# Patient Record
(demographics unavailable — no encounter records)

---

## 2025-01-29 NOTE — HISTORY OF PRESENT ILLNESS
[FreeTextEntry1] : Patient is here for a follow-up visit today, last seen on 7/1024, Is accompanied by her daughter and son. Patient has no new complaints, she is living at the assisted living facility with her , she reports she is enjoying living there, she attends physical therapy thrice weekly, plays bingo, works out and is social, has made friends, her  has not adjusted to the place well.  Patient has not noted any significant decline in her cognition or mood, as per her daughter she is less irritable and less anxious.  Patient continues to take sertraline 75 Mg daily, at the last visit, was started on memantine ER 7 Mg in addition she continues to take donepezil 5 Mg daily.    Patient followed up with neurosurgeon Dr. 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  Patient's daughter also informs me that on November 24th she had a fall at the assisted living facility, was taken to [facility], CT scan head and CT cervical spine showed no acute finding

## 2025-01-29 NOTE — DATA REVIEWED
[de-identified] : 1/11/2024: MR brain: Advanced subcortical white matter ischemia including shana.  Tiny infarctions in B/L basal ganglia and thalami.  Mild to moderate temporal and parietal lobe and cerebellar atrophy 4/2023: MRI brain severe chronic white matter ischemic changes.  No acute infarct. 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 Global cognitive score; 76.1 More than 1 SD below average noted in domains; Memory 77.4, executive function 79.6, attention 71.2 Below average in domains; None Above average in domains: None Information processing speed, visual-spatial function, verbal function and motor skills not assessed due to insufficient data [de-identified] : 3/14/2024: B12 773, folate > 20, TSH 2.41 10/18/23: MRI cervical spine: acute fracture of anterior inferior corner of C4, no bony retropulsion or cord compression was noted.   CT head no acute injury, CT face + nasal bone fracture,

## 2025-01-29 NOTE — REASON FOR VISIT
[Follow-Up: _____] : a [unfilled] follow-up visit [Family Member] : family member [FreeTextEntry1] : For dementia

## 2025-01-29 NOTE — REVIEW OF SYSTEMS
[Memory Lapses or Loss] : memory loss [Decr. Concentrating Ability] : decreased concentrating ability [Poor Coordination] : poor coordination [Dizziness] : dizziness [Difficulty Walking] : difficulty walking [Frequent Falls] : frequent falls [Anxiety] : anxiety [As Noted in HPI] : as noted in HPI [Negative] : Heme/Lymph [de-identified] : irritable with mood changes [FreeTextEntry8] : Increased urinary frequency occasional incontinence

## 2025-01-29 NOTE — DISCUSSION/SUMMARY
[FreeTextEntry1] : 84-year-old F with PMHx of HTN, HLD, anxiety, unruptured cerebral aneurysm, several falls over the past year 1, one resulted in thoracic compression fracture, most recent fall 10/2023 at [facility] resulting in nasal bone and C4 fracture, syncopal episode in August 2023, made remarkable recovery, has been using a walker for support, come in for short-term memory lapses and mild cognitive decline.   Cognitive test; Global score 76.1. >1 SD below average noted in memory, attention and executive function. 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

## 2025-01-29 NOTE — DATA REVIEWED
[de-identified] : 1/11/2024: MR brain: Advanced subcortical white matter ischemia including shana.  Tiny infarctions in B/L basal ganglia and thalami.  Mild to moderate temporal and parietal lobe and cerebellar atrophy 4/2023: MRI brain severe chronic white matter ischemic changes.  No acute infarct. 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 Global cognitive score; 76.1 More than 1 SD below average noted in domains; Memory 77.4, executive function 79.6, attention 71.2 Below average in domains; None Above average in domains: None Information processing speed, visual-spatial function, verbal function and motor skills not assessed due to insufficient data [de-identified] : 3/14/2024: B12 773, folate > 20, TSH 2.41 10/18/23: MRI cervical spine: acute fracture of anterior inferior corner of C4, no bony retropulsion or cord compression was noted.   CT head no acute injury, CT face + nasal bone fracture,

## 2025-01-29 NOTE — PHYSICAL EXAM
[General Appearance - Alert] : alert [General Appearance - In No Acute Distress] : in no acute distress [Oriented To Time, Place, And Person] : oriented to person, place, and time [Impaired Insight] : insight and judgment were intact [Person] : oriented to person [Place] : oriented to place [Time] : oriented to time [Registration Intact] : recent registration memory intact [Concentration Intact] : normal concentrating ability [Naming Objects] : no difficulty naming common objects [Repeating Phrases] : no difficulty repeating a phrase [Fluency] : fluency intact [Comprehension] : comprehension intact [Cranial Nerves Optic (II)] : visual acuity intact bilaterally,  visual fields full to confrontation, pupils equal round and reactive to light [Cranial Nerves Oculomotor (III)] : extraocular motion intact [Cranial Nerves Trigeminal (V)] : facial sensation intact symmetrically [Cranial Nerves Facial (VII)] : face symmetrical [Cranial Nerves Vestibulocochlear (VIII)] : hearing was intact bilaterally [Cranial Nerves Glossopharyngeal (IX)] : tongue and palate midline [Cranial Nerves Accessory (XI - Cranial And Spinal)] : head turning and shoulder shrug symmetric [Cranial Nerves Hypoglossal (XII)] : there was no tongue deviation with protrusion [Motor Tone] : muscle tone was normal in all four extremities [Motor Strength] : muscle strength was normal in all four extremities [No Muscle Atrophy] : normal bulk in all four extremities [Sensation Tactile Decrease] : light touch was intact [2+] : Brachioradialis left 2+ [1+] : Patella left 1+ [0] : Ankle jerk left 0 [PERRL With Normal Accommodation] : pupils were equal in size, round, reactive to light, with normal accommodation [Extraocular Movements] : extraocular movements were intact [Full Visual Field] : full visual field [Outer Ear] : the ears and nose were normal in appearance [Hearing Threshold Finger Rub Not [place]] : hearing was normal [] : the neck was supple [Short Term Intact] : short term memory impaired [Span Intact] : the attention span was decreased [Past-pointing] : there was no past-pointing [Tremor] : no tremor present [Coordination - Dysmetria Impaired Finger-to-Nose Bilateral] : not present [Plantar Reflex Right Only] : normal on the right [Plantar Reflex Left Only] : normal on the left [FreeTextEntry8] : Gait/balance: Mildly unstable gait, able to walk without assist but uses a walker for fear of falling [FreeTextEntry1] : Neck ROM full

## 2025-01-29 NOTE — DISCUSSION/SUMMARY
[FreeTextEntry1] : 84-year-old F with PMHx of HTN, HLD, anxiety, unruptured cerebral aneurysm, several falls over the past year 1, one resulted in thoracic compression fracture, most recent fall 10/2023 at [facility] resulting in nasal bone and C4 fracture, syncopal episode in August 2023, made remarkable recovery, has been using a walker for support, come in for short-term memory lapses and mild cognitive decline.   Cognitive test; Global score 76.1. >1 SD below average noted in memory, attention and executive function. 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

## 2025-01-29 NOTE — HISTORY OF PRESENT ILLNESS
[FreeTextEntry1] : Patient is here for a follow-up visit today, last seen on 7/1024, Is accompanied by her daughter and son. Patient has no new complaints, she is living at the assisted living facility with her , she reports she is enjoying living there, she attends physical therapy thrice weekly, plays bingo, works out and is social, has made friends, her  has not adjusted to the place well.  Patient has not noted any significant decline in her cognition or mood, as per her daughter she is less irritable and less anxious.  Patient continues to take sertraline 75 Mg daily, at the last visit, was started on memantine ER 7 Mg in addition she continues to take donepezil 5 Mg daily.    Patient followed up with neurosurgeon Dr. 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  Patient's daughter also informs me that on November 24th she had a fall at the assisted living facility, was taken to [facility], CT scan head and CT cervical spine showed no acute finding

## 2025-01-29 NOTE — REVIEW OF SYSTEMS
[Memory Lapses or Loss] : memory loss [Decr. Concentrating Ability] : decreased concentrating ability [Poor Coordination] : poor coordination [Dizziness] : dizziness [Difficulty Walking] : difficulty walking [Frequent Falls] : frequent falls [Anxiety] : anxiety [As Noted in HPI] : as noted in HPI [Negative] : Heme/Lymph [de-identified] : irritable with mood changes [FreeTextEntry8] : Increased urinary frequency occasional incontinence

## 2025-01-29 NOTE — PHYSICAL EXAM
[General Appearance - Alert] : alert [General Appearance - In No Acute Distress] : in no acute distress [Oriented To Time, Place, And Person] : oriented to person, place, and time [Impaired Insight] : insight and judgment were intact [Person] : oriented to person [Place] : oriented to place [Time] : oriented to time [Registration Intact] : recent registration memory intact [Concentration Intact] : normal concentrating ability [Naming Objects] : no difficulty naming common objects [Repeating Phrases] : no difficulty repeating a phrase [Fluency] : fluency intact [Comprehension] : comprehension intact [Cranial Nerves Optic (II)] : visual acuity intact bilaterally,  visual fields full to confrontation, pupils equal round and reactive to light [Cranial Nerves Oculomotor (III)] : extraocular motion intact [Cranial Nerves Trigeminal (V)] : facial sensation intact symmetrically [Cranial Nerves Facial (VII)] : face symmetrical [Cranial Nerves Vestibulocochlear (VIII)] : hearing was intact bilaterally [Cranial Nerves Glossopharyngeal (IX)] : tongue and palate midline [Cranial Nerves Accessory (XI - Cranial And Spinal)] : head turning and shoulder shrug symmetric [Cranial Nerves Hypoglossal (XII)] : there was no tongue deviation with protrusion [Motor Tone] : muscle tone was normal in all four extremities [Motor Strength] : muscle strength was normal in all four extremities [No Muscle Atrophy] : normal bulk in all four extremities [Sensation Tactile Decrease] : light touch was intact [2+] : Brachioradialis left 2+ [1+] : Patella left 1+ [0] : Ankle jerk left 0 [PERRL With Normal Accommodation] : pupils were equal in size, round, reactive to light, with normal accommodation [Extraocular Movements] : extraocular movements were intact [Full Visual Field] : full visual field [Outer Ear] : the ears and nose were normal in appearance [Hearing Threshold Finger Rub Not [place]] : hearing was normal [] : the neck was supple [Short Term Intact] : short term memory impaired [Span Intact] : the attention span was decreased [Past-pointing] : there was no past-pointing [Tremor] : no tremor present [Coordination - Dysmetria Impaired Finger-to-Nose Bilateral] : not present [Plantar Reflex Right Only] : normal on the right [Plantar Reflex Left Only] : normal on the left [FreeTextEntry8] : Gait/balance: Mildly unstable gait, able to walk without assist but uses a walker for fear of falling [FreeTextEntry1] : Neck ROM full

## 2025-05-31 NOTE — PLAN
[FreeTextEntry1] : Mycolog cream ordered for vaginal rash C/o of dysuria, UA POCT shows moderate blood, small leuks Patient to follow up in 1 year for annual GYN exam Mammogram: 6/2025 Rx given  Colonoscopy due:  NA  Bone density due:  6/2026 rx given  pap not done today secondary to age and hx of hysterectomy Hemoccult ordered  All questions answered, patient agreeable with plan. I Sally GARCIA am scribing for the presence of Dr. Teresa the following sections HISTORY OF PRESENT ILLNESS, PAST MEDICAL/FAMILY/SOCIAL HISTORY; REVIEW OF SYSTEMS; VITAL SIGNS; PHYSICAL EXAM; DISPOSITION.    I personally performed the services described in the documentation, reviewed the documentation recorded by the scribe in my presence and it accurately and completely records my words and actions.

## 2025-05-31 NOTE — HISTORY OF PRESENT ILLNESS
[TextBox_4] : 84 year old female hx of hysterectomy here today for annual visit. She has hx of osteoporosis took bisphosphates for about 4 years and stopped taking due to jaw pain. She is getting prolia infusions every 6 months. Taking calcium daily

## 2025-05-31 NOTE — PHYSICAL EXAM
[Appropriately responsive] : appropriately responsive [Alert] : alert [No Lymphadenopathy] : no lymphadenopathy [Soft] : soft [Non-tender] : non-tender [Oriented x3] : oriented x3 [Examination Of The Breasts] : a normal appearance [No Discharge] : no discharge [No Masses] : no breast masses were palpable [Labia Majora] : normal [Labia Minora] : normal [Normal] : normal [Atrophy] : atrophy [Cystocele] : a cystocele [No Bleeding] : There was no active vaginal bleeding [Absent] : absent [Uterine Adnexae] : normal [Normal rectal exam] : was normal [FreeTextEntry2] : Scarlet GARCIA chaperoned during entire physical exam, [Occult Blood Positive] : was negative for occult blood analysis

## 2025-06-03 NOTE — ASSESSMENT
[FreeTextEntry1] : Imp: Infectious cystitis secondary to urinary retention. Attending Attestation (For Attendings USE Only)...

## 2025-06-03 NOTE — END OF VISIT
[FreeTextEntry3] : Recommendation: A urinalysis, cytology, and culture have been ordered. The patient will start Bethanechol 50mg BID. She will follow up with a cystoscopy and urodynamic study in 1 month to determine if she has urethral stenosis.

## 2025-06-03 NOTE — ADDENDUM
[FreeTextEntry1] : I, Esmer uBrkett, acted as a scribe on behalf of Dr. Valentin 06/03/2025.   All medical record entries made by the Scribe were at my, Dr. Valentin, direction and personally dictated by me on 06/03/2025. I have reviewed the chart and agree that the record accurately reflects my personal performance of the history, physical exam, assessment, and plan. I have also personally directed, reviewed, and agreed with the chart.

## 2025-06-03 NOTE — HISTORY OF PRESENT ILLNESS
[FreeTextEntry1] : RHONDA MORGAN is a 84 year old female presenting with her daughter. She has been at a skilled nursing facility and is urinating without difficulty. A PVR today found a residual of 200cc. The patient is unaware of any urinary symptoms.

## 2025-07-02 NOTE — HISTORY OF PRESENT ILLNESS
[FreeTextEntry1] : Patient is here for a follow-up visit today, Is accompanied by her 2 daughters, was last seen in [facility] on 5/24/2025 where she had been admitted for gross hematuria, neurology was consulted for brief syncopal episode, EEG done was normal with no evidence of epileptiform activity seizures or events.  CT scan of the head revealed no acute intracranial hemorrhage, mass or shift.  Extensive chronic white matter microvascular type changes were noted.  Patient is here for follow-up visit today, she continues to live at the assisted living facility with her , her daughters and son visit her almost daily, they are concerned regarding her memory and functioning and also regarding her  who is repetitive, very pushy and argumentative.  Daughter also feels that she is more withdrawn and is communicating and talking less than previously  Upon questioning the patient, she reports that her 's repetitiveness or behavior do not bother her, she declines getting affected by his constant pushy nature.  Upon asking her when she was admitted to the hospital, she has no recollection of being in the hospital in the past couple of months.  She also believes the year is 2026 but does remember month of July.  She is very pleasant, cooperative, she reports she uses her walker for getting around, also tries to keep herself busy at the [facility], is attending physical therapy regularly.  Patient is continuing to take sertraline 75 Mg daily in addition to memantine 10 Mg daily and donepezil 5 Mg

## 2025-07-02 NOTE — REVIEW OF SYSTEMS
[Memory Lapses or Loss] : memory loss [Decr. Concentrating Ability] : decreased concentrating ability [Poor Coordination] : poor coordination [Dizziness] : dizziness [Difficulty Walking] : difficulty walking [Frequent Falls] : frequent falls [Anxiety] : anxiety [As Noted in HPI] : as noted in HPI [Negative] : Heme/Lymph [de-identified] : irritable with mood changes [FreeTextEntry8] : Increased urinary frequency occasional incontinence

## 2025-07-02 NOTE — DATA REVIEWED
[de-identified] : 1/11/2024: MR brain: Advanced subcortical white matter ischemia including shana.  Tiny infarctions in B/L basal ganglia and thalami.  Mild to moderate temporal and parietal lobe and cerebellar atrophy 4/2023: MRI brain severe chronic white matter ischemic changes.  No acute infarct. 8/2022: MRI brain: 5 mm right cavernous ICA aneurysm, 2.8 mm P-comm aneurysm [de-identified] : 5/28/25: EEG done was normal with no evidence of epileptiform activity seizures or events.  CT scan of the head revealed no acute intracranial hemorrhage, mass or shift.  Extensive chronic white matter microvascular type changes were noted. 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 Global cognitive score; 76.1 More than 1 SD below average noted in domains; Memory 77.4, executive function 79.6, attention 71.2 Below average in domains; None Above average in domains: None Information processing speed, visual-spatial function, verbal function and motor skills not assessed due to insufficient data [de-identified] : 5/24/25: CT head - no acute intracranial hemorrhage, mass or shift.  Extensive chronic white matter microvascular type changes . 3/14/2024: B12 773, folate > 20, TSH 2.41 10/18/23: MRI cervical spine: acute fracture of anterior inferior corner of C4, no bony retropulsion or cord compression was noted.   CT head no acute injury, CT face + nasal bone fracture,

## 2025-07-02 NOTE — PHYSICAL EXAM
[General Appearance - Alert] : alert [General Appearance - In No Acute Distress] : in no acute distress [Oriented To Time, Place, And Person] : oriented to person, place, and time [Impaired Insight] : insight and judgment were intact [Person] : oriented to person [Place] : oriented to place [Registration Intact] : recent registration memory intact [Concentration Intact] : normal concentrating ability [Naming Objects] : no difficulty naming common objects [Repeating Phrases] : no difficulty repeating a phrase [Fluency] : fluency intact [Comprehension] : comprehension intact [Cranial Nerves Optic (II)] : visual acuity intact bilaterally,  visual fields full to confrontation, pupils equal round and reactive to light [Cranial Nerves Oculomotor (III)] : extraocular motion intact [Cranial Nerves Trigeminal (V)] : facial sensation intact symmetrically [Cranial Nerves Facial (VII)] : face symmetrical [Cranial Nerves Vestibulocochlear (VIII)] : hearing was intact bilaterally [Cranial Nerves Glossopharyngeal (IX)] : tongue and palate midline [Cranial Nerves Accessory (XI - Cranial And Spinal)] : head turning and shoulder shrug symmetric [Cranial Nerves Hypoglossal (XII)] : there was no tongue deviation with protrusion [Motor Tone] : muscle tone was normal in all four extremities [Motor Strength] : muscle strength was normal in all four extremities [No Muscle Atrophy] : normal bulk in all four extremities [Sensation Tactile Decrease] : light touch was intact [2+] : Brachioradialis left 2+ [1+] : Patella left 1+ [0] : Ankle jerk left 0 [PERRL With Normal Accommodation] : pupils were equal in size, round, reactive to light, with normal accommodation [Extraocular Movements] : extraocular movements were intact [Full Visual Field] : full visual field [Outer Ear] : the ears and nose were normal in appearance [Hearing Threshold Finger Rub Not [place]] : hearing was normal [] : the neck was supple [Time] : disoriented to time [Short Term Intact] : short term memory impaired [Span Intact] : the attention span was decreased [Current Events] : inadequate knowledge of current events [Past-pointing] : there was no past-pointing [Tremor] : no tremor present [Coordination - Dysmetria Impaired Finger-to-Nose Bilateral] : not present [Plantar Reflex Right Only] : normal on the right [Plantar Reflex Left Only] : normal on the left [FreeTextEntry8] : Gait/balance: Mildly unstable gait, able to walk without assist but uses a walker for fear of falling [FreeTextEntry1] : Neck ROM full

## 2025-07-02 NOTE — REVIEW OF SYSTEMS
[Memory Lapses or Loss] : memory loss [Decr. Concentrating Ability] : decreased concentrating ability [Poor Coordination] : poor coordination [Dizziness] : dizziness [Difficulty Walking] : difficulty walking [Frequent Falls] : frequent falls [Anxiety] : anxiety [As Noted in HPI] : as noted in HPI [Negative] : Heme/Lymph [de-identified] : irritable with mood changes [FreeTextEntry8] : Increased urinary frequency occasional incontinence

## 2025-07-02 NOTE — HISTORY OF PRESENT ILLNESS
[FreeTextEntry1] : Patient is here for a follow-up visit today, Is accompanied by her 2 daughters, was last seen in [facility] on 5/24/2025 where she had been admitted for gross hematuria, neurology was consulted for brief syncopal episode, EEG done was normal with no evidence of epileptiform activity seizures or events.  CT scan of the head revealed no acute intracranial hemorrhage, mass or shift.  Extensive chronic white matter microvascular type changes were noted.  Patient is here for follow-up visit today, she continues to live at the assisted living facility with her , her daughters and son visit her almost daily, they are concerned regarding her memory and functioning and also regarding her  who is repetitive, very pushy and argumentative.  Daughter also feels that she is more withdrawn and is communicating and talking less than previously  Upon questioning the patient, she reports that her 's repetitiveness or behavior do not bother her, she declines getting affected by his constant pushy nature.  Upon asking her when she was admitted to the hospital, she has no recollection of being in the hospital in the past couple of months.  She also believes the year is 2026 but does remember month of July.  She is very pleasant, cooperative, she reports she uses her walker for getting around, also tries to keep herself busy at the [facility], is attending physical therapy regularly.  Patient is continuing to take sertraline 75 Mg daily in addition to memantine 10 Mg daily and donepezil 5 Mg

## 2025-07-02 NOTE — DATA REVIEWED
[de-identified] : 1/11/2024: MR brain: Advanced subcortical white matter ischemia including shana.  Tiny infarctions in B/L basal ganglia and thalami.  Mild to moderate temporal and parietal lobe and cerebellar atrophy 4/2023: MRI brain severe chronic white matter ischemic changes.  No acute infarct. 8/2022: MRI brain: 5 mm right cavernous ICA aneurysm, 2.8 mm P-comm aneurysm [de-identified] : 5/28/25: EEG done was normal with no evidence of epileptiform activity seizures or events.  CT scan of the head revealed no acute intracranial hemorrhage, mass or shift.  Extensive chronic white matter microvascular type changes were noted. 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 Global cognitive score; 76.1 More than 1 SD below average noted in domains; Memory 77.4, executive function 79.6, attention 71.2 Below average in domains; None Above average in domains: None Information processing speed, visual-spatial function, verbal function and motor skills not assessed due to insufficient data [de-identified] : 5/24/25: CT head - no acute intracranial hemorrhage, mass or shift.  Extensive chronic white matter microvascular type changes . 3/14/2024: B12 773, folate > 20, TSH 2.41 10/18/23: MRI cervical spine: acute fracture of anterior inferior corner of C4, no bony retropulsion or cord compression was noted.   CT head no acute injury, CT face + nasal bone fracture,

## 2025-07-02 NOTE — DISCUSSION/SUMMARY
[FreeTextEntry1] : 84-year-old F with PMHx of HTN, HLD, anxiety, unruptured cerebral aneurysm, several falls over the past year 1, one resulted in thoracic compression fracture, most recent fall 10/2023 at [facility] resulting in nasal bone and C4 fracture, syncopal episode in August 2023, made remarkable recovery, has been using a walker for support, come in for short-term memory lapses and mild cognitive decline.   Cognitive test; Global score 76.1. >1 SD below average noted in memory, attention and executive function. 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, FDG PET scan suggestive of LATE with concomitant VaD  # Depressed mood  - Have recommended continue Donepezil 5 mg daily - did not tolerate higher dose had dizziness - Continue memantine 100 Mg twice daily - Will increase Sertaline to 100 mg daily - I have recommended cognitive exercises, staying fully engaged in hobbies and recreational activities Had a detailed discussion with patient's 2 daughters, explained evolution of dementia, need for supportive care  # Gait instability and falls; MRI brain-no evidence of NPH  -Recommend PT on regular basis  # 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

## 2025-07-02 NOTE — DISCUSSION/SUMMARY
[FreeTextEntry1] : 84-year-old F with PMHx of HTN, HLD, anxiety, unruptured cerebral aneurysm, several falls over the past year 1, one resulted in thoracic compression fracture, most recent fall 10/2023 at [facility] resulting in nasal bone and C4 fracture, syncopal episode in August 2023, made remarkable recovery, has been using a walker for support, come in for short-term memory lapses and mild cognitive decline.   Cognitive test; Global score 76.1. >1 SD below average noted in memory, attention and executive function. 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, FDG PET scan suggestive of LATE with concomitant VaD  # Depressed mood  - Have recommended continue Donepezil 5 mg daily - did not tolerate higher dose had dizziness - Continue memantine 100 Mg twice daily - Will increase Sertaline to 100 mg daily - I have recommended cognitive exercises, staying fully engaged in hobbies and recreational activities Had a detailed discussion with patient's 2 daughters, explained evolution of dementia, need for supportive care  # Gait instability and falls; MRI brain-no evidence of NPH  -Recommend PT on regular basis  # 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

## 2025-07-02 NOTE — PHYSICAL EXAM
[General Appearance - Alert] : alert [General Appearance - In No Acute Distress] : in no acute distress [Oriented To Time, Place, And Person] : oriented to person, place, and time [Impaired Insight] : insight and judgment were intact [Person] : oriented to person [Place] : oriented to place [Registration Intact] : recent registration memory intact [Concentration Intact] : normal concentrating ability [Naming Objects] : no difficulty naming common objects [Repeating Phrases] : no difficulty repeating a phrase [Fluency] : fluency intact [Comprehension] : comprehension intact [Cranial Nerves Optic (II)] : visual acuity intact bilaterally,  visual fields full to confrontation, pupils equal round and reactive to light [Cranial Nerves Oculomotor (III)] : extraocular motion intact [Cranial Nerves Trigeminal (V)] : facial sensation intact symmetrically [Cranial Nerves Facial (VII)] : face symmetrical [Cranial Nerves Vestibulocochlear (VIII)] : hearing was intact bilaterally [Cranial Nerves Glossopharyngeal (IX)] : tongue and palate midline [Cranial Nerves Accessory (XI - Cranial And Spinal)] : head turning and shoulder shrug symmetric [Cranial Nerves Hypoglossal (XII)] : there was no tongue deviation with protrusion [Motor Tone] : muscle tone was normal in all four extremities [Motor Strength] : muscle strength was normal in all four extremities [No Muscle Atrophy] : normal bulk in all four extremities [Sensation Tactile Decrease] : light touch was intact [2+] : Brachioradialis left 2+ [1+] : Patella left 1+ [0] : Ankle jerk left 0 [PERRL With Normal Accommodation] : pupils were equal in size, round, reactive to light, with normal accommodation [Extraocular Movements] : extraocular movements were intact [Full Visual Field] : full visual field [Outer Ear] : the ears and nose were normal in appearance [Hearing Threshold Finger Rub Not [place]] : hearing was normal [] : the neck was supple [Time] : disoriented to time [Short Term Intact] : short term memory impaired [Span Intact] : the attention span was decreased [Current Events] : inadequate knowledge of current events [Past-pointing] : there was no past-pointing [Tremor] : no tremor present [Coordination - Dysmetria Impaired Finger-to-Nose Bilateral] : not present [Plantar Reflex Right Only] : normal on the right [Plantar Reflex Left Only] : normal on the left [FreeTextEntry8] : Gait/balance: Mildly unstable gait, able to walk without assist but uses a walker for fear of falling [FreeTextEntry1] : Neck ROM full